# Patient Record
Sex: MALE | Race: BLACK OR AFRICAN AMERICAN | Employment: UNEMPLOYED | URBAN - METROPOLITAN AREA
[De-identification: names, ages, dates, MRNs, and addresses within clinical notes are randomized per-mention and may not be internally consistent; named-entity substitution may affect disease eponyms.]

---

## 2021-11-20 ENCOUNTER — HOSPITAL ENCOUNTER (EMERGENCY)
Age: 36
Discharge: HOME | End: 2021-11-20
Attending: EMERGENCY MEDICINE | Admitting: EMERGENCY MEDICINE
Payer: MEDICAID

## 2021-11-20 VITALS
DIASTOLIC BLOOD PRESSURE: 90 MMHG | OXYGEN SATURATION: 100 % | SYSTOLIC BLOOD PRESSURE: 134 MMHG | HEART RATE: 88 BPM | RESPIRATION RATE: 18 BRPM | BODY MASS INDEX: 20.31 KG/M2 | HEIGHT: 72 IN | TEMPERATURE: 96.4 F | WEIGHT: 149.91 LBS

## 2021-11-20 DIAGNOSIS — Z72.0 TOBACCO USE: ICD-10-CM

## 2021-11-20 DIAGNOSIS — L40.9 PSORIASIS: Primary | ICD-10-CM

## 2021-11-20 DIAGNOSIS — G89.29 OTHER CHRONIC PAIN: ICD-10-CM

## 2021-11-20 PROCEDURE — 99281 EMR DPT VST MAYX REQ PHY/QHP: CPT

## 2021-11-20 RX ORDER — CYCLOBENZAPRINE HCL 10 MG
10 TABLET ORAL
Qty: 20 TABLET | Refills: 0 | Status: SHIPPED | OUTPATIENT
Start: 2021-11-20 | End: 2021-12-05

## 2021-11-20 RX ORDER — CALCIPOTRIENE, BETAMETHASONE DIPROPIONATE 50; .643 UG/G; MG/G
OINTMENT TOPICAL DAILY
Qty: 100 G | Refills: 0 | Status: SHIPPED | OUTPATIENT
Start: 2021-11-20 | End: 2021-12-18

## 2021-11-20 NOTE — ED PROVIDER NOTES
EMERGENCY DEPARTMENT HISTORY AND PHYSICAL EXAM      Date: 11/20/2021  Patient Name: Jigna Darden    History of Presenting Illness     Chief Complaint   Patient presents with    Skin Problem       History Provided By: Patient    HPI: Jigna Darden, 39 y.o. male with PMHx as noted below presents the emergency department for evaluation of skin lesions and chronic pain. Patient states that he has had skin rash now for years. Notes he been referred to dermatology several times but has never attempted to follow-up. Patient states that the rash is irritating and itchy. Patient also reports chronic right knee/leg pain that he states is also been present for years since a stroke. Ports a throbbing pain that is worse with movement. There is no swelling of the extremity. Pt denies any other alleviating or exacerbating factors. Additionally, pt specifically denies any recent fever, chills, headache, nausea, vomiting, abdominal pain, CP, SOB, lightheadedness, dizziness, numbness, weakness, BLE swelling, heart palpitations, urinary sxs, diarrhea, constipation, melena, hematochezia, cough, or congestion. PCP: None    Current Outpatient Medications   Medication Sig Dispense Refill    calcipotriene-betamethasone (TACLONEX) 0.005-0.064 % topical ointment Apply  to affected area daily for 28 days. 100 g 0    cyclobenzaprine (FLEXERIL) 10 mg tablet Take 1 Tablet by mouth three (3) times daily as needed for Muscle Spasm(s) for up to 15 days. 20 Tablet 0       Past History     Past Medical History:  No past medical history on file. Past Surgical History:  No past surgical history on file. Family History:  No family history on file. Social History:  Social History     Tobacco Use    Smoking status: Not on file    Smokeless tobacco: Not on file   Substance Use Topics    Alcohol use: Not on file    Drug use: Not on file       Allergies:   Allergies   Allergen Reactions    Hydrocodone Swelling Review of Systems   Review of Systems  Constitutional: Negative for fever, chills, and fatigue. HENT: Negative for congestion, sore throat, rhinorrhea, sneezing and neck stiffness   Eyes: Negative for discharge and redness. Respiratory: Negative for  shortness of breath, wheezing   Cardiovascular: Negative for chest pain, palpitations   Gastrointestinal: Negative for nausea, vomiting, abdominal pain, constipation, diarrhea and blood in stool. Genitourinary: Negative for dysuria, hematuria, flank pain, decreased urine volume, discharge,   Musculoskeletal: Negative for myalgias. Positive leg pain  Skin: Positive for rash. Negative lesions . Neurological: Negative weakness, light-headedness, numbness and headaches. Physical Exam   Physical Exam    GENERAL: alert and oriented, no acute distress  EYES: PEERL, No injection, discharge or icterus. ENT: Mucous membranes pink and moist.  NECK: Supple  LUNGS: Airway patent. Non-labored respirations. Breath sounds clear with good air entry bilaterally. HEART: Regular rate and rhythm. No peripheral edema  ABDOMEN: Non-distended and non-tender, without guarding or rebound. SKIN:  warm, dry, multiple plaques noted throughout the patient's body, no erythema, purulence, swelling  MSK/EXTREMITIES: Without swelling, tenderness or deformity, symmetric with normal ROM, there is no warmth, erythema of the joint, no swelling of the extremity, palpable distal pulses  NEUROLOGICAL: Alert, oriented      Diagnostic Study Results     Labs -   No results found for this or any previous visit (from the past 12 hour(s)). Radiologic Studies -   No orders to display     CT Results  (Last 48 hours)    None        CXR Results  (Last 48 hours)    None            Medical Decision Making     IMartínez MD am the first provider for this patient and am the attending of record for this patient encounter.     I reviewed the vital signs, available nursing notes, past medical history, past surgical history, family history and social history. Vital Signs-Reviewed the patient's vital signs. Patient Vitals for the past 12 hrs:   Temp Pulse Resp BP SpO2   11/20/21 1639 (!) 96.4 °F (35.8 °C) 88 18 (!) 134/90 100 %         Records Reviewed: Nursing Notes and Old Medical Records    Provider Notes (Medical Decision Making): On presentation, the patient is well appearing, in no acute distress with normal vital signs. with regards to the skin lesions, findings are consistent with psoriasis. No signs of superimposed cellulitis or abscess. Will prescribe topical steroids and have referred to dermatology. Patient also reporting chronic right knee/leg pain for \"years\". There is no swelling of the leg and duration would be not indicative of an underlying DVT. No sign of infectious process on exam.  Will trial course of muscle relaxers    ED Course:   Initial assessment performed. The patients presenting problems have been discussed, and they are in agreement with the care plan formulated and outlined with them. I have encouraged them to ask questions as they arise throughout their visit. Medications - No data to display       Tobacco Cessation Counseling   I spent 3 minutes discussing the medical risks of prolonged smoking habits and advised the patient of the benefits of the cessation of smoking, providing specific suggestions on how to quit. Parisa Berrios MD   .      PROGRESS  Roldan Santos  results have been reviewed with him. He has been counseled regarding his diagnosis. He verbally conveys understanding and agreement of the signs, symptoms, diagnosis, treatment and prognosis and additionally agrees to follow up as recommended. He also agrees with the care-plan and conveys that all of his questions have been answered.   I have also put together some discharge instructions for him that include: 1) educational information regarding their diagnosis, 2) how to care for their diagnosis at home, as well a 3) list of reasons why they would want to return to the ED prior to their follow-up appointment, should their condition change. Disposition:  home    PLAN:  1. Current Discharge Medication List      START taking these medications    Details   calcipotriene-betamethasone (TACLONEX) 0.005-0.064 % topical ointment Apply  to affected area daily for 28 days. Qty: 100 g, Refills: 0  Start date: 11/20/2021, End date: 12/18/2021      cyclobenzaprine (FLEXERIL) 10 mg tablet Take 1 Tablet by mouth three (3) times daily as needed for Muscle Spasm(s) for up to 15 days. Qty: 20 Tablet, Refills: 0  Start date: 11/20/2021, End date: 12/5/2021           2. Follow-up Information     Follow up With Specialties Details Why [address] [facility] EMERGENCY DEPT Emergency Medicine  If symptoms worsen [address] [facility] Dermatology  Schedule an appointment as soon as possible for a visit in 2 days  [address]  [phone]        Return to ED if worse     Diagnosis     Clinical Impression:   1. Psoriasis    2. Other chronic pain    3. Tobacco use        Please note that this dictation was completed with Dragon, computer voice recognition software. Quite often unanticipated grammatical, syntax, homophones, and other interpretive errors are inadvertently transcribed by the computer software. Please disregard these errors. Additionally, please excuse any errors that have escaped final proofreading.

## 2021-11-20 NOTE — ED TRIAGE NOTES
Patient comes to the ED for treatment of chronic rash. Seen previously for the same.   Patient also wishes to have pain \"medicine\" for chronic leg pain Statement Selected

## 2021-11-20 NOTE — LETTER
[address] EMERGENCY DEPT  [address] [facility] [id number] [phone]    Work/School Note    Date: 11/20/2021    To Whom It May concern:    Harshal Melton was seen and treated today in the emergency room by the following provider(s):  Attending Provider: Maria Luisa Malin MD.      Harshal Melton may return to work on 11/22/2021.     Sincerely,                    Aerial PELON

## 2024-07-17 ENCOUNTER — HOSPITAL ENCOUNTER (OUTPATIENT)
Facility: HOSPITAL | Age: 39
Setting detail: SPECIMEN
Discharge: HOME | End: 2024-07-20

## 2024-07-17 LAB
DATE LAST DOSE: NORMAL
DOSE AMOUNT: NORMAL UNITS
VANCOMYCIN SERPL-MCNC: 18.4 UG/ML

## 2024-07-17 PROCEDURE — 80202 ASSAY OF VANCOMYCIN: CPT

## 2024-07-19 ENCOUNTER — CLINICAL DOCUMENTATION (OUTPATIENT)
Facility: HOSPITAL | Age: 39
End: 2024-07-19

## 2024-07-19 ENCOUNTER — HOSPITAL ENCOUNTER (OUTPATIENT)
Facility: HOSPITAL | Age: 39
Setting detail: SPECIMEN
Discharge: HOME | End: 2024-07-22

## 2024-07-19 LAB
DATE LAST DOSE: ABNORMAL
DOSE AMOUNT: ABNORMAL UNITS
VANCOMYCIN TROUGH SERPL-MCNC: 17.2 UG/ML (ref 5–10)

## 2024-07-19 PROCEDURE — 80202 ASSAY OF VANCOMYCIN: CPT
